# Patient Record
Sex: FEMALE | Race: BLACK OR AFRICAN AMERICAN | NOT HISPANIC OR LATINO
[De-identification: names, ages, dates, MRNs, and addresses within clinical notes are randomized per-mention and may not be internally consistent; named-entity substitution may affect disease eponyms.]

---

## 2017-01-19 ENCOUNTER — RX RENEWAL (OUTPATIENT)
Age: 42
End: 2017-01-19

## 2017-01-27 ENCOUNTER — RX RENEWAL (OUTPATIENT)
Age: 42
End: 2017-01-27

## 2017-04-13 ENCOUNTER — RX RENEWAL (OUTPATIENT)
Age: 42
End: 2017-04-13

## 2017-05-16 ENCOUNTER — LABORATORY RESULT (OUTPATIENT)
Age: 42
End: 2017-05-16

## 2017-05-16 ENCOUNTER — OUTPATIENT (OUTPATIENT)
Dept: OUTPATIENT SERVICES | Facility: HOSPITAL | Age: 42
LOS: 1 days | End: 2017-05-16
Payer: MEDICAID

## 2017-05-16 ENCOUNTER — APPOINTMENT (OUTPATIENT)
Dept: INFECTIOUS DISEASE | Facility: CLINIC | Age: 42
End: 2017-05-16

## 2017-05-16 VITALS
SYSTOLIC BLOOD PRESSURE: 126 MMHG | BODY MASS INDEX: 29.88 KG/M2 | OXYGEN SATURATION: 98 % | DIASTOLIC BLOOD PRESSURE: 91 MMHG | HEIGHT: 64 IN | HEART RATE: 71 BPM | TEMPERATURE: 97.5 F | WEIGHT: 175 LBS

## 2017-05-16 DIAGNOSIS — B20 HUMAN IMMUNODEFICIENCY VIRUS [HIV] DISEASE: ICD-10-CM

## 2017-05-16 LAB
ALBUMIN SERPL ELPH-MCNC: 4.2 G/DL — SIGNIFICANT CHANGE UP (ref 3.3–5)
ALP SERPL-CCNC: 54 U/L — SIGNIFICANT CHANGE UP (ref 40–120)
ALT FLD-CCNC: 19 U/L — SIGNIFICANT CHANGE UP (ref 10–45)
ANION GAP SERPL CALC-SCNC: 15 MMOL/L — SIGNIFICANT CHANGE UP (ref 5–17)
AST SERPL-CCNC: 23 U/L — SIGNIFICANT CHANGE UP (ref 10–40)
BILIRUB SERPL-MCNC: 0.6 MG/DL — SIGNIFICANT CHANGE UP (ref 0.2–1.2)
BUN SERPL-MCNC: 9 MG/DL — SIGNIFICANT CHANGE UP (ref 7–23)
CALCIUM SERPL-MCNC: 9.4 MG/DL — SIGNIFICANT CHANGE UP (ref 8.4–10.5)
CHLORIDE SERPL-SCNC: 104 MMOL/L — SIGNIFICANT CHANGE UP (ref 96–108)
CO2 SERPL-SCNC: 21 MMOL/L — LOW (ref 22–31)
CREAT SERPL-MCNC: 1.04 MG/DL — SIGNIFICANT CHANGE UP (ref 0.5–1.3)
GLUCOSE SERPL-MCNC: 79 MG/DL — SIGNIFICANT CHANGE UP (ref 70–99)
HBA1C BLD-MCNC: 5.5 % — SIGNIFICANT CHANGE UP (ref 4–5.6)
HCT VFR BLD CALC: 37.7 % — SIGNIFICANT CHANGE UP (ref 34.5–45)
HGB BLD-MCNC: 12 G/DL — SIGNIFICANT CHANGE UP (ref 11.5–15.5)
MCHC RBC-ENTMCNC: 31.8 GM/DL — LOW (ref 32–36)
MCHC RBC-ENTMCNC: 32.1 PG — SIGNIFICANT CHANGE UP (ref 27–34)
MCV RBC AUTO: 100.8 FL — HIGH (ref 80–100)
PLATELET # BLD AUTO: 335 K/UL — SIGNIFICANT CHANGE UP (ref 150–400)
POTASSIUM SERPL-MCNC: 4 MMOL/L — SIGNIFICANT CHANGE UP (ref 3.5–5.3)
POTASSIUM SERPL-SCNC: 4 MMOL/L — SIGNIFICANT CHANGE UP (ref 3.5–5.3)
PROT SERPL-MCNC: 7.7 G/DL — SIGNIFICANT CHANGE UP (ref 6–8.3)
RBC # BLD: 3.74 M/UL — LOW (ref 3.8–5.2)
RBC # FLD: 13.5 % — SIGNIFICANT CHANGE UP (ref 10.3–14.5)
SODIUM SERPL-SCNC: 140 MMOL/L — SIGNIFICANT CHANGE UP (ref 135–145)
WBC # BLD: 7.62 K/UL — SIGNIFICANT CHANGE UP (ref 3.8–10.5)
WBC # FLD AUTO: 7.62 K/UL — SIGNIFICANT CHANGE UP (ref 3.8–10.5)

## 2017-05-16 PROCEDURE — 85027 COMPLETE CBC AUTOMATED: CPT

## 2017-05-16 PROCEDURE — 83036 HEMOGLOBIN GLYCOSYLATED A1C: CPT

## 2017-05-16 PROCEDURE — 87536 HIV-1 QUANT&REVRSE TRNSCRPJ: CPT

## 2017-05-16 PROCEDURE — 80053 COMPREHEN METABOLIC PANEL: CPT

## 2017-05-16 PROCEDURE — 86780 TREPONEMA PALLIDUM: CPT

## 2017-05-16 PROCEDURE — 90471 IMMUNIZATION ADMIN: CPT

## 2017-05-16 PROCEDURE — 86360 T CELL ABSOLUTE COUNT/RATIO: CPT

## 2017-05-16 PROCEDURE — 86592 SYPHILIS TEST NON-TREP QUAL: CPT

## 2017-05-16 PROCEDURE — G0463: CPT | Mod: 25

## 2017-05-16 PROCEDURE — 84156 ASSAY OF PROTEIN URINE: CPT

## 2017-05-16 PROCEDURE — 90734 MENACWYD/MENACWYCRM VACC IM: CPT

## 2017-05-16 PROCEDURE — 90834 PSYTX W PT 45 MINUTES: CPT

## 2017-05-17 LAB
4/8 RATIO: 0.56 RATIO — LOW (ref 0.9–3.6)
ABS CD8: 978 /UL — HIGH (ref 136–757)
C TRACH RRNA SPEC QL NAA+PROBE: SIGNIFICANT CHANGE UP
CD3 BLASTS SPEC-ACNC: 1519 /UL — SIGNIFICANT CHANGE UP (ref 799–2171)
CD3 BLASTS SPEC-ACNC: 74 % — SIGNIFICANT CHANGE UP (ref 59–85)
CD4 %: 27 % — LOW (ref 36–65)
CD8 %: 48 % — HIGH (ref 11–36)
CREAT ?TM UR-MCNC: 300 MG/DL — SIGNIFICANT CHANGE UP
HIV1 RNA # SERPL NAA+PROBE: SIGNIFICANT CHANGE UP
HIV1 RNA SERPL NAA+PROBE-LOG#: ABNORMAL LG10COP/ML
N GONORRHOEA RRNA SPEC QL NAA+PROBE: SIGNIFICANT CHANGE UP
PROT ?TM UR-MCNC: 18 MG/DL — HIGH (ref 0–12)
PROT/CREAT UR-RTO: 0.1 — SIGNIFICANT CHANGE UP
SPECIMEN SOURCE: SIGNIFICANT CHANGE UP
T-CELL CD4 SUBSET PNL BLD: 544 /UL — SIGNIFICANT CHANGE UP (ref 489–1457)

## 2017-05-18 LAB
RPR SERPL-ACNC: SIGNIFICANT CHANGE UP
T PALLIDUM AB TITR SER: POSITIVE
T PALLIDUM IGG SER QL IF: REACTIVE

## 2017-05-31 DIAGNOSIS — Z23 ENCOUNTER FOR IMMUNIZATION: ICD-10-CM

## 2017-06-22 ENCOUNTER — RX RENEWAL (OUTPATIENT)
Age: 42
End: 2017-06-22

## 2017-09-14 ENCOUNTER — RX RENEWAL (OUTPATIENT)
Age: 42
End: 2017-09-14

## 2017-10-17 ENCOUNTER — APPOINTMENT (OUTPATIENT)
Dept: INFECTIOUS DISEASE | Facility: CLINIC | Age: 42
End: 2017-10-17

## 2017-10-18 ENCOUNTER — RX RENEWAL (OUTPATIENT)
Age: 42
End: 2017-10-18

## 2017-11-15 ENCOUNTER — RX RENEWAL (OUTPATIENT)
Age: 42
End: 2017-11-15

## 2017-11-21 ENCOUNTER — APPOINTMENT (OUTPATIENT)
Dept: INFECTIOUS DISEASE | Facility: CLINIC | Age: 42
End: 2017-11-21

## 2018-01-25 ENCOUNTER — RX RENEWAL (OUTPATIENT)
Age: 43
End: 2018-01-25

## 2018-03-02 ENCOUNTER — RX RENEWAL (OUTPATIENT)
Age: 43
End: 2018-03-02

## 2018-03-06 ENCOUNTER — APPOINTMENT (OUTPATIENT)
Dept: INFECTIOUS DISEASE | Facility: CLINIC | Age: 43
End: 2018-03-06

## 2018-04-10 ENCOUNTER — RX RENEWAL (OUTPATIENT)
Age: 43
End: 2018-04-10

## 2018-06-11 ENCOUNTER — RX RENEWAL (OUTPATIENT)
Age: 43
End: 2018-06-11

## 2018-08-01 ENCOUNTER — OUTPATIENT (OUTPATIENT)
Dept: OUTPATIENT SERVICES | Facility: HOSPITAL | Age: 43
LOS: 1 days | End: 2018-08-01
Payer: MEDICAID

## 2018-08-01 PROCEDURE — G9001: CPT

## 2018-08-28 DIAGNOSIS — Z71.89 OTHER SPECIFIED COUNSELING: ICD-10-CM

## 2018-09-10 ENCOUNTER — MEDICATION RENEWAL (OUTPATIENT)
Age: 43
End: 2018-09-10

## 2018-10-10 ENCOUNTER — APPOINTMENT (OUTPATIENT)
Dept: INFECTIOUS DISEASE | Facility: CLINIC | Age: 43
End: 2018-10-10

## 2018-10-15 ENCOUNTER — RX RENEWAL (OUTPATIENT)
Age: 43
End: 2018-10-15

## 2018-10-16 ENCOUNTER — RX RENEWAL (OUTPATIENT)
Age: 43
End: 2018-10-16

## 2018-11-16 ENCOUNTER — RX RENEWAL (OUTPATIENT)
Age: 43
End: 2018-11-16

## 2018-11-27 ENCOUNTER — RX RENEWAL (OUTPATIENT)
Age: 43
End: 2018-11-27

## 2018-11-28 ENCOUNTER — APPOINTMENT (OUTPATIENT)
Dept: INFECTIOUS DISEASE | Facility: CLINIC | Age: 43
End: 2018-11-28

## 2018-11-28 ENCOUNTER — CHART COPY (OUTPATIENT)
Age: 43
End: 2018-11-28

## 2018-11-28 ENCOUNTER — OUTPATIENT (OUTPATIENT)
Dept: OUTPATIENT SERVICES | Facility: HOSPITAL | Age: 43
LOS: 1 days | End: 2018-11-28
Payer: MEDICAID

## 2018-11-28 ENCOUNTER — APPOINTMENT (OUTPATIENT)
Dept: INFECTIOUS DISEASE | Facility: CLINIC | Age: 43
End: 2018-11-28
Payer: MEDICAID

## 2018-11-28 VITALS
WEIGHT: 171 LBS | SYSTOLIC BLOOD PRESSURE: 163 MMHG | RESPIRATION RATE: 16 BRPM | TEMPERATURE: 97.1 F | BODY MASS INDEX: 29.35 KG/M2 | OXYGEN SATURATION: 98 % | HEART RATE: 75 BPM | DIASTOLIC BLOOD PRESSURE: 108 MMHG

## 2018-11-28 PROCEDURE — 99214 OFFICE O/P EST MOD 30 MIN: CPT

## 2018-11-28 PROCEDURE — 90686 IIV4 VACC NO PRSV 0.5 ML IM: CPT

## 2018-11-28 PROCEDURE — G0008: CPT

## 2018-11-28 PROCEDURE — G0463: CPT | Mod: 25

## 2018-11-30 DIAGNOSIS — B20 HUMAN IMMUNODEFICIENCY VIRUS [HIV] DISEASE: ICD-10-CM

## 2018-12-05 DIAGNOSIS — Z23 ENCOUNTER FOR IMMUNIZATION: ICD-10-CM

## 2019-02-07 ENCOUNTER — RX RENEWAL (OUTPATIENT)
Age: 44
End: 2019-02-07

## 2019-03-05 ENCOUNTER — RX RENEWAL (OUTPATIENT)
Age: 44
End: 2019-03-05

## 2019-03-06 ENCOUNTER — RX RENEWAL (OUTPATIENT)
Age: 44
End: 2019-03-06

## 2019-03-27 ENCOUNTER — APPOINTMENT (OUTPATIENT)
Dept: INFECTIOUS DISEASE | Facility: CLINIC | Age: 44
End: 2019-03-27

## 2019-04-03 ENCOUNTER — RX RENEWAL (OUTPATIENT)
Age: 44
End: 2019-04-03

## 2019-05-01 ENCOUNTER — RX RENEWAL (OUTPATIENT)
Age: 44
End: 2019-05-01

## 2019-08-20 ENCOUNTER — RX RENEWAL (OUTPATIENT)
Age: 44
End: 2019-08-20

## 2019-08-20 PROBLEM — Z00.00 ENCOUNTER FOR PREVENTIVE HEALTH EXAMINATION: Noted: 2019-08-20

## 2019-08-27 ENCOUNTER — FORM ENCOUNTER (OUTPATIENT)
Age: 44
End: 2019-08-27

## 2019-08-28 ENCOUNTER — APPOINTMENT (OUTPATIENT)
Dept: INFECTIOUS DISEASE | Facility: CLINIC | Age: 44
End: 2019-08-28

## 2019-08-28 ENCOUNTER — OUTPATIENT (OUTPATIENT)
Dept: OUTPATIENT SERVICES | Facility: HOSPITAL | Age: 44
LOS: 1 days | End: 2019-08-28
Payer: MEDICAID

## 2019-08-28 ENCOUNTER — APPOINTMENT (OUTPATIENT)
Dept: INFECTIOUS DISEASE | Facility: CLINIC | Age: 44
End: 2019-08-28
Payer: MEDICAID

## 2019-08-28 VITALS
DIASTOLIC BLOOD PRESSURE: 100 MMHG | WEIGHT: 169 LBS | OXYGEN SATURATION: 100 % | HEART RATE: 62 BPM | TEMPERATURE: 97.4 F | SYSTOLIC BLOOD PRESSURE: 155 MMHG | BODY MASS INDEX: 28.85 KG/M2 | HEIGHT: 64 IN

## 2019-08-28 PROCEDURE — 99214 OFFICE O/P EST MOD 30 MIN: CPT

## 2019-08-28 PROCEDURE — G0463: CPT | Mod: 25

## 2019-08-28 PROCEDURE — 90834 PSYTX W PT 45 MINUTES: CPT

## 2019-09-02 NOTE — PHYSICAL EXAM
[General Appearance - Alert] : alert [Sclera] : the sclera and conjunctiva were normal [PERRL With Normal Accommodation] : pupils were equal in size, round, reactive to light [Extraocular Movements] : extraocular movements were intact [Outer Ear] : the ears and nose were normal in appearance [Oropharynx] : the oropharynx was normal with no thrush [Neck Appearance] : the appearance of the neck was normal [Neck Cervical Mass (___cm)] : no neck mass was observed [Jugular Venous Distention Increased] : there was no jugular-venous distention [Thyroid Diffuse Enlargement] : the thyroid was not enlarged [Auscultation Breath Sounds / Voice Sounds] : lungs were clear to auscultation bilaterally [Heart Rate And Rhythm] : heart rate was normal and rhythm regular [Heart Sounds] : normal S1 and S2 [Heart Sounds Gallop] : no gallops [Murmurs] : no murmurs [Heart Sounds Pericardial Friction Rub] : no pericardial rub [Full Pulse] : the pedal pulses are present [Edema] : there was no peripheral edema [Bowel Sounds] : normal bowel sounds [Abdomen Soft] : soft [Abdomen Tenderness] : non-tender [Abdomen Mass (___ Cm)] : no abdominal mass palpated [Costovertebral Angle Tenderness] : no CVA tenderness [Urinary Bladder Findings] : the bladder was normal on palpation [No Palpable Adenopathy] : no palpable adenopathy [Musculoskeletal - Swelling] : no joint swelling [Nail Clubbing] : no clubbing  or cyanosis of the fingernails [Motor Tone] : muscle strength and tone were normal [Skin Color & Pigmentation] : normal skin color and pigmentation [] : no rash [Oriented To Time, Place, And Person] : oriented to person, place, and time [Affect] : the affect was normal [FreeTextEntry1] : obese

## 2019-09-02 NOTE — ASSESSMENT
[Treatment Education] : treatment education [Treatment Adherence] : treatment adherence [Rx Dose / Side Effects] : Rx dose/side effects [Risk Reduction] : risk reduction [Nutritional / Food Issues] : nutritional/food issues [Universal Precautions] : universal precautions [Medical Care Issues] : medical care issues [HIV Education] : HIV Education [Sexuality / Safer Sex] : sexuality/safer sex [Partner Notification Info/Discussion] : partner notification info/discussion [FreeTextEntry1] : 40 yo F w/ hx of latent syphills s/p tx, HTN, HIV AIDS dx 1/13 currently well-controlled on complera here for followup.\par \par 1. HIV/AIDS - last CD4 544 improved from previous  392, VL <40 - continue with complera does not want to change her regimen; check labs today including viral load, T cell subset, CBC and BMP\par \par 2. HTN - pt has tried several different Bp medications, currently listed on Triamterene-HCTZ, pt is unaware of the name of her medication\par \par 3. HCM - completed hep B vaccinations back in 7/14 but hep B s AB indeterminate - booster given 12/14 -repeat Hep B Ab checked 4/15 (-), series repeated and completed 2016. UTD on pna, tdap, hep A vaccines, pap 8/14 nl.  \par Infleunza today\par \par Pt would like 6 month follow up. She would prefer to see Dr Arboleda if possible\par \par \par \par

## 2019-09-02 NOTE — END OF VISIT
[] : Fellow [FreeTextEntry3] : Despite her missed visits, uncooperative nature and drug-seeking, Ms Mcgee has well controlled HIV on Complera.  However, her HTN is not well controlled. She may be non-adherent to diuretic or need a second med. We told her we would not prescribe opioids for her ill-defined chronic pain.

## 2019-09-02 NOTE — HISTORY OF PRESENT ILLNESS
[Condom Use] : using condoms [FreeTextEntry1] : 40 yo F w/ hx of HIV/AIDS well-controlled on complera, HTN, hx of latent syphillis tx'ed here for follow up visit. \par \par Previous history of poor follow up in clinic but last seen in Nov 2018. Patient with intermittent refusal to participate in exam after showing up 1 hour late,. Says she wants her previous doctor who is a male, though last seen by Dr Moreno and does not know the name of other doctor. Denies fever, chills, worsening of chronic cough, throat pain, dysuria, discharge. Denies current drug use though appears to be altered during interview. Denies sexual intercourse\par \par Patient complains of vague pain giving her problem sleeping. After asking several times, she says she has back pain for which she sees chiropractor and would like oxycontin. Told patient about non-opiod options such as lidocaine patch which she was not interested in\par  [Sexually Active] : The patient is not sexually active [de-identified] : none, planing to go to [place] in near future [de-identified] : not working

## 2019-10-03 DIAGNOSIS — B20 HUMAN IMMUNODEFICIENCY VIRUS [HIV] DISEASE: ICD-10-CM

## 2019-10-07 ENCOUNTER — RX RENEWAL (OUTPATIENT)
Age: 44
End: 2019-10-07

## 2019-12-03 ENCOUNTER — RX RENEWAL (OUTPATIENT)
Age: 44
End: 2019-12-03

## 2019-12-13 ENCOUNTER — RX RENEWAL (OUTPATIENT)
Age: 44
End: 2019-12-13

## 2020-02-05 ENCOUNTER — RX RENEWAL (OUTPATIENT)
Age: 45
End: 2020-02-05

## 2020-03-22 ENCOUNTER — RX RENEWAL (OUTPATIENT)
Age: 45
End: 2020-03-22

## 2020-04-17 ENCOUNTER — RX RENEWAL (OUTPATIENT)
Age: 45
End: 2020-04-17

## 2020-04-21 ENCOUNTER — RX RENEWAL (OUTPATIENT)
Age: 45
End: 2020-04-21

## 2020-06-17 ENCOUNTER — RX RENEWAL (OUTPATIENT)
Age: 45
End: 2020-06-17

## 2020-08-19 ENCOUNTER — RX RENEWAL (OUTPATIENT)
Age: 45
End: 2020-08-19

## 2020-10-06 ENCOUNTER — FORM ENCOUNTER (OUTPATIENT)
Age: 45
End: 2020-10-06

## 2020-10-06 ENCOUNTER — RX RENEWAL (OUTPATIENT)
Age: 45
End: 2020-10-06

## 2020-10-07 ENCOUNTER — APPOINTMENT (OUTPATIENT)
Dept: INFECTIOUS DISEASE | Facility: CLINIC | Age: 45
End: 2020-10-07
Payer: MEDICAID

## 2020-10-07 ENCOUNTER — LABORATORY RESULT (OUTPATIENT)
Age: 45
End: 2020-10-07

## 2020-10-07 ENCOUNTER — OUTPATIENT (OUTPATIENT)
Dept: OUTPATIENT SERVICES | Facility: HOSPITAL | Age: 45
LOS: 1 days | End: 2020-10-07
Payer: MEDICAID

## 2020-10-07 VITALS
SYSTOLIC BLOOD PRESSURE: 176 MMHG | OXYGEN SATURATION: 99 % | HEART RATE: 62 BPM | BODY MASS INDEX: 26.46 KG/M2 | WEIGHT: 155 LBS | HEIGHT: 64 IN | DIASTOLIC BLOOD PRESSURE: 117 MMHG | TEMPERATURE: 97.9 F

## 2020-10-07 DIAGNOSIS — Z23 ENCOUNTER FOR IMMUNIZATION: ICD-10-CM

## 2020-10-07 DIAGNOSIS — B20 HUMAN IMMUNODEFICIENCY VIRUS [HIV] DISEASE: ICD-10-CM

## 2020-10-07 PROCEDURE — 99215 OFFICE O/P EST HI 40 MIN: CPT | Mod: GC

## 2020-10-07 PROCEDURE — G0008: CPT

## 2020-10-07 PROCEDURE — 90688 IIV4 VACCINE SPLT 0.5 ML IM: CPT

## 2020-10-07 PROCEDURE — 90834 PSYTX W PT 45 MINUTES: CPT

## 2020-10-07 PROCEDURE — G0463: CPT | Mod: 25

## 2020-10-08 NOTE — END OF VISIT
[] : Fellow [FreeTextEntry3] : Patient seen and examined with Dr. García\par \par I agree with his history exam and plans as noted above\par \par Patient was last seen in clinic over a year ago but reports adherence with ARVs\par \par Needs updated annual labs to assess stability and gyn referral, declines psych referral for support\par \par RTC 3-4mo

## 2020-10-08 NOTE — PHYSICAL EXAM
[General Appearance - Alert] : alert [Sclera] : the sclera and conjunctiva were normal [PERRL With Normal Accommodation] : pupils were equal in size, round, reactive to light [Extraocular Movements] : extraocular movements were intact [Outer Ear] : the ears and nose were normal in appearance [Oropharynx] : the oropharynx was normal with no thrush [Neck Appearance] : the appearance of the neck was normal [Neck Cervical Mass (___cm)] : no neck mass was observed [Jugular Venous Distention Increased] : there was no jugular-venous distention [Thyroid Diffuse Enlargement] : the thyroid was not enlarged [Auscultation Breath Sounds / Voice Sounds] : lungs were clear to auscultation bilaterally [Heart Rate And Rhythm] : heart rate was normal and rhythm regular [Heart Sounds] : normal S1 and S2 [Heart Sounds Gallop] : no gallops [Murmurs] : no murmurs [Heart Sounds Pericardial Friction Rub] : no pericardial rub [Full Pulse] : the pedal pulses are present [Edema] : there was no peripheral edema [Bowel Sounds] : normal bowel sounds [Abdomen Soft] : soft [Abdomen Tenderness] : non-tender [] : no hepato-splenomegaly [Abdomen Mass (___ Cm)] : no abdominal mass palpated [Costovertebral Angle Tenderness] : no CVA tenderness [Urinary Bladder Findings] : the bladder was normal on palpation [No Palpable Adenopathy] : no palpable adenopathy [Musculoskeletal - Swelling] : no joint swelling [Nail Clubbing] : no clubbing  or cyanosis of the fingernails [Motor Tone] : muscle strength and tone were normal [Oriented To Time, Place, And Person] : oriented to person, place, and time [Affect] : the affect was normal [FreeTextEntry1] : Few scattered vesicular appearing lesions around sacrum, swabbed for PCR

## 2020-10-08 NOTE — ASSESSMENT
[Treatment Education] : treatment education [Treatment Adherence] : treatment adherence [Rx Dose / Side Effects] : Rx dose/side effects [Risk Reduction] : risk reduction [Nutritional / Food Issues] : nutritional/food issues [Universal Precautions] : universal precautions [Medical Care Issues] : medical care issues [HIV Education] : HIV Education [Sexuality / Safer Sex] : sexuality/safer sex [Partner Notification Info/Discussion] : partner notification info/discussion [FreeTextEntry1] : 44 yo F w/ hx of latent syphills s/p tx, HTN, HIV AIDS currently well-controlled on complera here for followup.\par \par  HIV/AIDS \par -well controlled on previous labs, no labs since 8/19, will recheck today\par -Pt takes complera, not interested in updating regimen in spite of counseling\par -check annual labs\par \par Vesicular Rash\par -HSV more likely than VZV. Describes occasional cold sores and states she gets this rash on her sacrum every 6 months or so.\par -swabbed for PCR, follow up results\par \par  HTN - uncontrolled\par -Pt is states that her PCP managed her BP but states that our clinic refills her medications. Unclear how often she is following with PCP\par -BP is uncontrolled on this visit and patient is only listed on clonidine, which is not idea BP medication\par -Only allergy listed was amlodipine, on chart review pt was previously on Lisinopril 2016 but reportedly has rash as reaction, allergies updated\par -most recently appeared to be on Triamterene-HCTZ, appears to have been discontinued by pharmacy in spring 2020, likely due to patients lack of follow up\par -Ideally believe pt should follow with PCP for HTN management, however she is unclear on who is managing her BP and has history of poor follow up. Due to elevated BP at this time will initiate therapy\par -will restart HCTZ 12.5 mg daily, advised patient to return in one month for BP check and repeat labs\par -home BP machine ordered\par \par HCM\par  - Up to date on previous vaccinations\par - encouraged patient to take influenza vaccine today,unclear if she will agree based on concern over her previous "reaction"\par -has PCP, patient is unclear on follow up history but states she followed with him in last few months\par -states she is up to date on mammography with PCP, cannot state when she last had it\par -no fam hx of colon ca\par \par Recommended to follow up in one month for BP recheck and to monitor electrolytes and renal function\par \par \par \par

## 2020-10-08 NOTE — HISTORY OF PRESENT ILLNESS
[Condom Use] : using condoms [FreeTextEntry1] : 44 yo F w/ hx of HIV/AIDS well-controlled on complera, HTN, hx of latent syphillis tx'ed here for follow up visit. \par \par Pt with history of poor follow up last seen in August 2019 by myself. Claims compliance with Complera, which she does not want to update to newer ART. No adverse effects, no fever, chills, sore throat, diarrhea, dysuria. Admits to intermittent flares of sacral rash, says it "resolved with cream". Adamantly denies history of herpes, but states that she sometimes gets "cold sores".\par \par Pt has PCP but unclear how often she follows with him. States she last saw within last 3-4 months and is up to date on mammograms.\par  [Sexually Active] : The patient is not sexually active [de-identified] : none, planing to go to [place] in near future [de-identified] : not working

## 2020-10-09 ENCOUNTER — RX RENEWAL (OUTPATIENT)
Age: 45
End: 2020-10-09

## 2020-10-12 DIAGNOSIS — Z23 ENCOUNTER FOR IMMUNIZATION: ICD-10-CM

## 2020-11-24 ENCOUNTER — RX RENEWAL (OUTPATIENT)
Age: 45
End: 2020-11-24

## 2020-11-24 RX ORDER — VALACYCLOVIR 500 MG/1
500 TABLET, FILM COATED ORAL
Qty: 20 | Refills: 0 | Status: ACTIVE | COMMUNITY
Start: 2020-10-10

## 2021-01-04 ENCOUNTER — NON-APPOINTMENT (OUTPATIENT)
Age: 46
End: 2021-01-04

## 2021-01-22 ENCOUNTER — RX RENEWAL (OUTPATIENT)
Age: 46
End: 2021-01-22

## 2021-02-07 ENCOUNTER — RX RENEWAL (OUTPATIENT)
Age: 46
End: 2021-02-07

## 2021-02-16 ENCOUNTER — FORM ENCOUNTER (OUTPATIENT)
Age: 46
End: 2021-02-16

## 2021-02-17 ENCOUNTER — OUTPATIENT (OUTPATIENT)
Dept: OUTPATIENT SERVICES | Facility: HOSPITAL | Age: 46
LOS: 1 days | End: 2021-02-17
Payer: MEDICAID

## 2021-02-17 ENCOUNTER — APPOINTMENT (OUTPATIENT)
Dept: INFECTIOUS DISEASE | Facility: CLINIC | Age: 46
End: 2021-02-17
Payer: MEDICAID

## 2021-02-17 ENCOUNTER — APPOINTMENT (OUTPATIENT)
Dept: INFECTIOUS DISEASE | Facility: CLINIC | Age: 46
End: 2021-02-17

## 2021-02-17 ENCOUNTER — LABORATORY RESULT (OUTPATIENT)
Age: 46
End: 2021-02-17

## 2021-02-17 VITALS
OXYGEN SATURATION: 99 % | TEMPERATURE: 97.6 F | DIASTOLIC BLOOD PRESSURE: 118 MMHG | WEIGHT: 148 LBS | BODY MASS INDEX: 25.27 KG/M2 | HEIGHT: 64 IN | SYSTOLIC BLOOD PRESSURE: 166 MMHG | HEART RATE: 64 BPM

## 2021-02-17 VITALS — SYSTOLIC BLOOD PRESSURE: 167 MMHG | DIASTOLIC BLOOD PRESSURE: 114 MMHG | HEART RATE: 55 BPM

## 2021-02-17 DIAGNOSIS — B20 HUMAN IMMUNODEFICIENCY VIRUS [HIV] DISEASE: ICD-10-CM

## 2021-02-17 DIAGNOSIS — N89.8 OTHER SPECIFIED NONINFLAMMATORY DISORDERS OF VAGINA: ICD-10-CM

## 2021-02-17 PROCEDURE — G0463: CPT

## 2021-02-17 PROCEDURE — 99214 OFFICE O/P EST MOD 30 MIN: CPT | Mod: GC

## 2021-02-17 PROCEDURE — 87491 CHLMYD TRACH DNA AMP PROBE: CPT

## 2021-02-17 PROCEDURE — 90834 PSYTX W PT 45 MINUTES: CPT

## 2021-02-17 PROCEDURE — 87591 N.GONORRHOEAE DNA AMP PROB: CPT

## 2021-02-17 RX ORDER — CLOTRIMAZOLE 10 MG/G
1 CREAM TOPICAL 3 TIMES DAILY
Qty: 1 | Refills: 0 | Status: ACTIVE | COMMUNITY
Start: 2021-02-17 | End: 1900-01-01

## 2021-02-17 RX ORDER — PERMETHRIN 1 %-0.25 %
1 & 0.25 COMBINATION PACKAGE (ML) MISCELLANEOUS
Qty: 1 | Refills: 0 | Status: ACTIVE | COMMUNITY
Start: 2021-02-17 | End: 1900-01-01

## 2021-02-18 LAB
C TRACH RRNA SPEC QL NAA+PROBE: SIGNIFICANT CHANGE UP
N GONORRHOEA RRNA SPEC QL NAA+PROBE: SIGNIFICANT CHANGE UP
SPECIMEN SOURCE: SIGNIFICANT CHANGE UP

## 2021-02-20 NOTE — PHYSICAL EXAM
[General Appearance - Alert] : alert [Sclera] : the sclera and conjunctiva were normal [PERRL With Normal Accommodation] : pupils were equal in size, round, reactive to light [Extraocular Movements] : extraocular movements were intact [Outer Ear] : the ears and nose were normal in appearance [Oropharynx] : the oropharynx was normal with no thrush [Neck Appearance] : the appearance of the neck was normal [Neck Cervical Mass (___cm)] : no neck mass was observed [Jugular Venous Distention Increased] : there was no jugular-venous distention [Thyroid Diffuse Enlargement] : the thyroid was not enlarged [Auscultation Breath Sounds / Voice Sounds] : lungs were clear to auscultation bilaterally [Heart Rate And Rhythm] : heart rate was normal and rhythm regular [Heart Sounds] : normal S1 and S2 [Heart Sounds Gallop] : no gallops [Murmurs] : no murmurs [Heart Sounds Pericardial Friction Rub] : no pericardial rub [Full Pulse] : the pedal pulses are present [Edema] : there was no peripheral edema [Bowel Sounds] : normal bowel sounds [Abdomen Soft] : soft [Abdomen Tenderness] : non-tender [Abdomen Mass (___ Cm)] : no abdominal mass palpated [Costovertebral Angle Tenderness] : no CVA tenderness [Urinary Bladder Findings] : the bladder was normal on palpation [No Palpable Adenopathy] : no palpable adenopathy [Musculoskeletal - Swelling] : no joint swelling [Nail Clubbing] : no clubbing  or cyanosis of the fingernails [Motor Tone] : muscle strength and tone were normal [Oriented To Time, Place, And Person] : oriented to person, place, and time [Affect] : the affect was normal [Skin Color & Pigmentation] : normal skin color and pigmentation [] : no rash [Deep Tendon Reflexes (DTR)] : deep tendon reflexes were 2+ and symmetric [Sensation] : the sensory exam was normal to light touch and pinprick [No Focal Deficits] : no focal deficits [FreeTextEntry1] : Defers  exam

## 2021-02-20 NOTE — END OF VISIT
[] : Fellow [FreeTextEntry3] : Patient seen and examined with Dr García\dinorah \par I agree with his interval history and exam and plans as outlined above

## 2021-02-20 NOTE — ASSESSMENT
[Treatment Education] : treatment education [Treatment Adherence] : treatment adherence [Rx Dose / Side Effects] : Rx dose/side effects [Risk Reduction] : risk reduction [Nutritional / Food Issues] : nutritional/food issues [Universal Precautions] : universal precautions [Medical Care Issues] : medical care issues [HIV Education] : HIV Education [Sexuality / Safer Sex] : sexuality/safer sex [Partner Notification Info/Discussion] : partner notification info/discussion [FreeTextEntry1] : 44 yo F w/ hx of latent syphills s/p tx, HTN, HIV AIDS currently well-controlled on complera here for followup.\par \par  HIV/AIDS \par -well controlled will recheck today\par -Pt takes complera, not interested in updating regimen in spite of counseling\par -check T cell subset, VL\par \par Vaginitis\par -Pt complains of vaginal pruritis, defers exam.\par -GYN referral\par -Vaginitis panel sent. Clotrimazole cream\par \par Vesicular Rash of Sacrum - resolved\par -HSV more likely than VZV. Describes occasional cold sores and states she gets this rash on her sacrum every 6 months or so.\par -swabbed for PCR, HSV-2\par -no recurrence since last visit\par \par  HTN - uncontrolled\par -Pt states she is compliant with HCTZ and clonidine daily, BP still uncontrolled\par -not following with PCP for HTN in spite of recommendations\par -no signs of HTN emergency\par -Only allergy listed was amlodipine, on chart review pt was previously on Lisinopril 2016 but reportedly has rash as reaction, allergies updated\par -CArdiology referral \par -most recently appeared to be on Triamterene-HCTZ, appears to have been discontinued by pharmacy in spring 2020, likely due to patients lack of follow up\par -Ideally believe pt should follow with PCP for HTN management, however she is unclear on who is managing her BP and has history of poor follow up. Due to elevated BP at this time will initiate therapy\par \par \par HCM\par  - Up to date on previous vaccinations\par - encouraged patient to take influenza vaccine today,unclear if she will agree based on concern over her previous "reaction"\par -states she is up to date on mammography with PCP, cannot state when she last had it\par -no fam hx of colon ca\par \par \par \par \par \par

## 2021-02-20 NOTE — HISTORY OF PRESENT ILLNESS
[Condom Use] : using condoms [FreeTextEntry1] : 46 yo F w/ hx of HIV/AIDS well-controlled on complera, uncontrolled HTN, hx of latent syphillis tx'ed. HSV2 infection of sacrum, here for follow up visit. \par \par Pt is feeling well, daily compliance with complera with no missed doses. Denies fever, chills, cough, sore throat, dysphagia, abdominal pain, diarrhea, dysuria.\par \par Complains of vaginal pruritis for two weeks since staying at her sisters place. Is worried about bed bugs or crabs. Denies itching anywhere else on body, denies discharge, dysuria, sexual activity in years.\par \par Patients HTN remains uncontrolled. /118, consistent with previous admissions. States daily compliance with her anti-HTN ( clonidine and HCTZ), takes BP at home every few days, says it is "less than 200". Denies HA, CP, blurry vision.\par \par Has vesicular lesions on sacrum last visit, postiive for HSV-2 on swab. Pt was prescribed valtrex but has not needed, denies recurrence, denies cold sores recently. [Sexually Active] : The patient is not sexually active [de-identified] : none, planing to go to [place] in near future [de-identified] : not working

## 2021-03-05 ENCOUNTER — NON-APPOINTMENT (OUTPATIENT)
Age: 46
End: 2021-03-05

## 2021-03-05 RX ORDER — HYDROCHLOROTHIAZIDE 12.5 MG/1
12.5 CAPSULE ORAL DAILY
Qty: 60 | Refills: 5 | Status: DISCONTINUED | COMMUNITY
Start: 2020-10-07 | End: 2021-03-05

## 2021-03-09 ENCOUNTER — NON-APPOINTMENT (OUTPATIENT)
Age: 46
End: 2021-03-09

## 2021-03-12 ENCOUNTER — NON-APPOINTMENT (OUTPATIENT)
Age: 46
End: 2021-03-12

## 2021-03-15 ENCOUNTER — NON-APPOINTMENT (OUTPATIENT)
Age: 46
End: 2021-03-15

## 2021-03-16 ENCOUNTER — RX RENEWAL (OUTPATIENT)
Age: 46
End: 2021-03-16

## 2021-03-19 ENCOUNTER — NON-APPOINTMENT (OUTPATIENT)
Age: 46
End: 2021-03-19

## 2021-03-29 ENCOUNTER — NON-APPOINTMENT (OUTPATIENT)
Age: 46
End: 2021-03-29

## 2021-04-05 ENCOUNTER — NON-APPOINTMENT (OUTPATIENT)
Age: 46
End: 2021-04-05

## 2021-04-05 RX ORDER — CLONIDINE HYDROCHLORIDE 0.1 MG/1
0.1 TABLET ORAL
Qty: 120 | Refills: 1 | Status: DISCONTINUED | COMMUNITY
Start: 2020-03-09 | End: 2021-04-05

## 2021-04-06 ENCOUNTER — NON-APPOINTMENT (OUTPATIENT)
Age: 46
End: 2021-04-06

## 2021-04-12 ENCOUNTER — NON-APPOINTMENT (OUTPATIENT)
Age: 46
End: 2021-04-12

## 2021-04-13 ENCOUNTER — NON-APPOINTMENT (OUTPATIENT)
Age: 46
End: 2021-04-13

## 2021-04-15 ENCOUNTER — APPOINTMENT (OUTPATIENT)
Dept: CARDIOLOGY | Facility: CLINIC | Age: 46
End: 2021-04-15
Payer: MEDICAID

## 2021-04-15 VITALS
OXYGEN SATURATION: 98 % | WEIGHT: 154 LBS | RESPIRATION RATE: 16 BRPM | TEMPERATURE: 97.2 F | SYSTOLIC BLOOD PRESSURE: 127 MMHG | BODY MASS INDEX: 26.43 KG/M2 | HEART RATE: 50 BPM | DIASTOLIC BLOOD PRESSURE: 82 MMHG

## 2021-04-15 DIAGNOSIS — R00.1 BRADYCARDIA, UNSPECIFIED: ICD-10-CM

## 2021-04-15 DIAGNOSIS — R00.0 TACHYCARDIA, UNSPECIFIED: ICD-10-CM

## 2021-04-15 PROCEDURE — 99204 OFFICE O/P NEW MOD 45 MIN: CPT

## 2021-04-15 PROCEDURE — 93000 ELECTROCARDIOGRAM COMPLETE: CPT

## 2021-04-15 PROCEDURE — 99072 ADDL SUPL MATRL&STAF TM PHE: CPT

## 2021-04-15 RX ORDER — HYDROCHLOROTHIAZIDE 25 MG/1
25 TABLET ORAL
Refills: 0 | Status: ACTIVE | COMMUNITY
Start: 2021-04-15

## 2021-04-15 NOTE — PHYSICAL EXAM
[General Appearance - Well Developed] : well developed [Normal Appearance] : normal appearance [Well Groomed] : well groomed [General Appearance - Well Nourished] : well nourished [No Deformities] : no deformities [General Appearance - In No Acute Distress] : no acute distress [Normal Conjunctiva] : the conjunctiva exhibited no abnormalities [Eyelids - No Xanthelasma] : the eyelids demonstrated no xanthelasmas [Normal Oral Mucosa] : normal oral mucosa [No Oral Pallor] : no oral pallor [No Oral Cyanosis] : no oral cyanosis [Normal Jugular Venous A Waves Present] : normal jugular venous A waves present [Normal Jugular Venous V Waves Present] : normal jugular venous V waves present [No Jugular Venous Regan A Waves] : no jugular venous regan A waves [Heart Rate And Rhythm] : heart rate and rhythm were normal [Heart Sounds] : normal S1 and S2 [Murmurs] : no murmurs present [Respiration, Rhythm And Depth] : normal respiratory rhythm and effort [Auscultation Breath Sounds / Voice Sounds] : lungs were clear to auscultation bilaterally [Exaggerated Use Of Accessory Muscles For Inspiration] : no accessory muscle use [Abdomen Soft] : soft [Abdomen Tenderness] : non-tender [Abdomen Mass (___ Cm)] : no abdominal mass palpated [Nail Clubbing] : no clubbing of the fingernails [Cyanosis, Localized] : no localized cyanosis [Petechial Hemorrhages (___cm)] : no petechial hemorrhages [Skin Color & Pigmentation] : normal skin color and pigmentation [] : no rash [No Venous Stasis] : no venous stasis [Skin Lesions] : no skin lesions [No Skin Ulcers] : no skin ulcer [No Xanthoma] : no  xanthoma was observed [Oriented To Time, Place, And Person] : oriented to person, place, and time [Affect] : the affect was normal [Mood] : the mood was normal [No Anxiety] : not feeling anxious

## 2021-04-15 NOTE — REASON FOR VISIT
[Initial Evaluation] : an initial evaluation of [Hypertension] : hypertension [Abnormal ECG] : an abnormal ECG [FreeTextEntry1] : 45 F HTN abnormal EKG with uncontrolled HTN and bradycardia.

## 2021-04-15 NOTE — HISTORY OF PRESENT ILLNESS
[FreeTextEntry1] : 1. HTN: on clonidine. Patient reports recent improved control.\par 2. HIV: on medications.\par 3. Abnormal EKG: patient was noted to be bradycardic and referred for further evaluation. She has noted intermittent chest pain with palpitations and dizziness lasting a few seconds. The patient denies SOB and her ET is stable. She denies cough or fevers.\par She has not received her COVID vaccine and states that she doesn't want to get it because "DMX  after getting the vaccine".

## 2021-04-19 ENCOUNTER — NON-APPOINTMENT (OUTPATIENT)
Age: 46
End: 2021-04-19

## 2021-04-27 ENCOUNTER — FORM ENCOUNTER (OUTPATIENT)
Age: 46
End: 2021-04-27

## 2021-04-27 ENCOUNTER — APPOINTMENT (OUTPATIENT)
Dept: CARDIOLOGY | Facility: CLINIC | Age: 46
End: 2021-04-27

## 2021-05-10 ENCOUNTER — NON-APPOINTMENT (OUTPATIENT)
Age: 46
End: 2021-05-10

## 2021-05-11 ENCOUNTER — NON-APPOINTMENT (OUTPATIENT)
Age: 46
End: 2021-05-11

## 2021-05-13 ENCOUNTER — RX RENEWAL (OUTPATIENT)
Age: 46
End: 2021-05-13

## 2021-05-14 ENCOUNTER — NON-APPOINTMENT (OUTPATIENT)
Age: 46
End: 2021-05-14

## 2021-05-18 ENCOUNTER — RX RENEWAL (OUTPATIENT)
Age: 46
End: 2021-05-18

## 2021-06-08 ENCOUNTER — RX RENEWAL (OUTPATIENT)
Age: 46
End: 2021-06-08

## 2021-06-25 ENCOUNTER — APPOINTMENT (OUTPATIENT)
Dept: INFECTIOUS DISEASE | Facility: CLINIC | Age: 46
End: 2021-06-25

## 2021-06-28 ENCOUNTER — NON-APPOINTMENT (OUTPATIENT)
Age: 46
End: 2021-06-28

## 2021-07-02 ENCOUNTER — NON-APPOINTMENT (OUTPATIENT)
Age: 46
End: 2021-07-02

## 2021-07-15 ENCOUNTER — RX RENEWAL (OUTPATIENT)
Age: 46
End: 2021-07-15

## 2021-08-16 ENCOUNTER — RX RENEWAL (OUTPATIENT)
Age: 46
End: 2021-08-16

## 2021-08-16 ENCOUNTER — NON-APPOINTMENT (OUTPATIENT)
Age: 46
End: 2021-08-16

## 2021-09-14 ENCOUNTER — RX RENEWAL (OUTPATIENT)
Age: 46
End: 2021-09-14

## 2021-09-29 ENCOUNTER — APPOINTMENT (OUTPATIENT)
Dept: INFECTIOUS DISEASE | Facility: CLINIC | Age: 46
End: 2021-09-29

## 2021-09-30 ENCOUNTER — NON-APPOINTMENT (OUTPATIENT)
Age: 46
End: 2021-09-30

## 2021-10-01 ENCOUNTER — NON-APPOINTMENT (OUTPATIENT)
Age: 46
End: 2021-10-01

## 2021-10-04 ENCOUNTER — NON-APPOINTMENT (OUTPATIENT)
Age: 46
End: 2021-10-04

## 2021-10-05 ENCOUNTER — NON-APPOINTMENT (OUTPATIENT)
Age: 46
End: 2021-10-05

## 2021-10-10 ENCOUNTER — RX RENEWAL (OUTPATIENT)
Age: 46
End: 2021-10-10

## 2021-10-12 ENCOUNTER — NON-APPOINTMENT (OUTPATIENT)
Age: 46
End: 2021-10-12

## 2021-10-19 ENCOUNTER — RX RENEWAL (OUTPATIENT)
Age: 46
End: 2021-10-19

## 2021-11-02 ENCOUNTER — NON-APPOINTMENT (OUTPATIENT)
Age: 46
End: 2021-11-02

## 2021-11-05 ENCOUNTER — NON-APPOINTMENT (OUTPATIENT)
Age: 46
End: 2021-11-05

## 2021-11-07 ENCOUNTER — RX RENEWAL (OUTPATIENT)
Age: 46
End: 2021-11-07

## 2021-11-16 ENCOUNTER — RX RENEWAL (OUTPATIENT)
Age: 46
End: 2021-11-16

## 2021-11-18 ENCOUNTER — RX RENEWAL (OUTPATIENT)
Age: 46
End: 2021-11-18

## 2021-11-22 ENCOUNTER — NON-APPOINTMENT (OUTPATIENT)
Age: 46
End: 2021-11-22

## 2021-12-08 ENCOUNTER — APPOINTMENT (OUTPATIENT)
Dept: INFECTIOUS DISEASE | Facility: CLINIC | Age: 46
End: 2021-12-08

## 2021-12-15 ENCOUNTER — APPOINTMENT (OUTPATIENT)
Dept: INFECTIOUS DISEASE | Facility: CLINIC | Age: 46
End: 2021-12-15

## 2021-12-17 ENCOUNTER — RX RENEWAL (OUTPATIENT)
Age: 46
End: 2021-12-17

## 2021-12-17 ENCOUNTER — NON-APPOINTMENT (OUTPATIENT)
Age: 46
End: 2021-12-17

## 2021-12-20 ENCOUNTER — NON-APPOINTMENT (OUTPATIENT)
Age: 46
End: 2021-12-20

## 2021-12-22 ENCOUNTER — RX RENEWAL (OUTPATIENT)
Age: 46
End: 2021-12-22

## 2022-01-05 ENCOUNTER — APPOINTMENT (OUTPATIENT)
Dept: INFECTIOUS DISEASE | Facility: CLINIC | Age: 47
End: 2022-01-05

## 2022-01-06 ENCOUNTER — NON-APPOINTMENT (OUTPATIENT)
Age: 47
End: 2022-01-06

## 2022-02-14 ENCOUNTER — RX RENEWAL (OUTPATIENT)
Age: 47
End: 2022-02-14

## 2022-03-11 ENCOUNTER — RX RENEWAL (OUTPATIENT)
Age: 47
End: 2022-03-11

## 2022-03-11 ENCOUNTER — NON-APPOINTMENT (OUTPATIENT)
Age: 47
End: 2022-03-11

## 2022-04-07 ENCOUNTER — RX RENEWAL (OUTPATIENT)
Age: 47
End: 2022-04-07

## 2022-05-31 ENCOUNTER — FORM ENCOUNTER (OUTPATIENT)
Age: 47
End: 2022-05-31

## 2022-06-01 ENCOUNTER — APPOINTMENT (OUTPATIENT)
Dept: INFECTIOUS DISEASE | Facility: CLINIC | Age: 47
End: 2022-06-01
Payer: MEDICAID

## 2022-06-01 ENCOUNTER — LABORATORY RESULT (OUTPATIENT)
Age: 47
End: 2022-06-01

## 2022-06-01 ENCOUNTER — NON-APPOINTMENT (OUTPATIENT)
Age: 47
End: 2022-06-01

## 2022-06-01 ENCOUNTER — OUTPATIENT (OUTPATIENT)
Dept: OUTPATIENT SERVICES | Facility: HOSPITAL | Age: 47
LOS: 1 days | End: 2022-06-01
Payer: MEDICAID

## 2022-06-01 VITALS
OXYGEN SATURATION: 100 % | TEMPERATURE: 97.6 F | RESPIRATION RATE: 14 BRPM | SYSTOLIC BLOOD PRESSURE: 112 MMHG | HEART RATE: 69 BPM | DIASTOLIC BLOOD PRESSURE: 78 MMHG | HEIGHT: 64 IN | BODY MASS INDEX: 29.37 KG/M2 | WEIGHT: 172 LBS

## 2022-06-01 DIAGNOSIS — B20 HUMAN IMMUNODEFICIENCY VIRUS [HIV] DISEASE: ICD-10-CM

## 2022-06-01 LAB
4/8 RATIO: 0.77 RATIO — LOW (ref 0.9–3.6)
ABS CD8: 825 /UL — HIGH (ref 142–740)
BASOPHILS # BLD AUTO: 0.04 K/UL — SIGNIFICANT CHANGE UP (ref 0–0.2)
BASOPHILS NFR BLD AUTO: 0.5 % — SIGNIFICANT CHANGE UP (ref 0–2)
CD3 BLASTS SPEC-ACNC: 1491 /UL — SIGNIFICANT CHANGE UP (ref 672–1870)
CD3 BLASTS SPEC-ACNC: 80 % — SIGNIFICANT CHANGE UP (ref 59–83)
CD4 %: 34 % — SIGNIFICANT CHANGE UP (ref 30–62)
CD8 %: 44 % — HIGH (ref 12–36)
EOSINOPHIL # BLD AUTO: 0.13 K/UL — SIGNIFICANT CHANGE UP (ref 0–0.5)
EOSINOPHIL NFR BLD AUTO: 1.8 % — SIGNIFICANT CHANGE UP (ref 0–6)
HBV CORE AB SER-ACNC: SIGNIFICANT CHANGE UP
HCT VFR BLD CALC: 38.4 % — SIGNIFICANT CHANGE UP (ref 34.5–45)
HCV AB S/CO SERPL IA: 0.1 S/CO — SIGNIFICANT CHANGE UP (ref 0–0.99)
HCV AB SERPL-IMP: SIGNIFICANT CHANGE UP
HGB BLD-MCNC: 13 G/DL — SIGNIFICANT CHANGE UP (ref 11.5–15.5)
IMM GRANULOCYTES NFR BLD AUTO: 0.1 % — SIGNIFICANT CHANGE UP (ref 0–1.5)
LYMPHOCYTES # BLD AUTO: 2.06 K/UL — SIGNIFICANT CHANGE UP (ref 1–3.3)
LYMPHOCYTES # BLD AUTO: 28.1 % — SIGNIFICANT CHANGE UP (ref 13–44)
MANUAL SMEAR VERIFICATION: SIGNIFICANT CHANGE UP
MCHC RBC-ENTMCNC: 33 PG — SIGNIFICANT CHANGE UP (ref 27–34)
MCHC RBC-ENTMCNC: 33.9 GM/DL — SIGNIFICANT CHANGE UP (ref 32–36)
MCV RBC AUTO: 97.5 FL — SIGNIFICANT CHANGE UP (ref 80–100)
MONOCYTES # BLD AUTO: 0.51 K/UL — SIGNIFICANT CHANGE UP (ref 0–0.9)
MONOCYTES NFR BLD AUTO: 7 % — SIGNIFICANT CHANGE UP (ref 2–14)
NEUTROPHILS # BLD AUTO: 4.57 K/UL — SIGNIFICANT CHANGE UP (ref 1.8–7.4)
NEUTROPHILS NFR BLD AUTO: 62.5 % — SIGNIFICANT CHANGE UP (ref 43–77)
PLAT MORPH BLD: NORMAL — SIGNIFICANT CHANGE UP
PLATELET # BLD AUTO: 295 K/UL — SIGNIFICANT CHANGE UP (ref 150–400)
RBC # BLD: 3.94 M/UL — SIGNIFICANT CHANGE UP (ref 3.8–5.2)
RBC # FLD: 12.7 % — SIGNIFICANT CHANGE UP (ref 10.3–14.5)
RBC BLD AUTO: NORMAL — SIGNIFICANT CHANGE UP
T-CELL CD4 SUBSET PNL BLD: 634 /UL — SIGNIFICANT CHANGE UP (ref 489–1457)
WBC # BLD: 7.32 K/UL — SIGNIFICANT CHANGE UP (ref 3.8–10.5)
WBC # FLD AUTO: 7.32 K/UL — SIGNIFICANT CHANGE UP (ref 3.8–10.5)

## 2022-06-01 PROCEDURE — 87491 CHLMYD TRACH DNA AMP PROBE: CPT

## 2022-06-01 PROCEDURE — 99214 OFFICE O/P EST MOD 30 MIN: CPT | Mod: GC

## 2022-06-01 PROCEDURE — 86360 T CELL ABSOLUTE COUNT/RATIO: CPT

## 2022-06-01 PROCEDURE — 80053 COMPREHEN METABOLIC PANEL: CPT

## 2022-06-01 PROCEDURE — 85025 COMPLETE CBC W/AUTO DIFF WBC: CPT

## 2022-06-01 PROCEDURE — G0463: CPT

## 2022-06-01 PROCEDURE — 36415 COLL VENOUS BLD VENIPUNCTURE: CPT

## 2022-06-01 PROCEDURE — 80061 LIPID PANEL: CPT

## 2022-06-01 PROCEDURE — 86593 SYPHILIS TEST NON-TREP QUANT: CPT

## 2022-06-01 PROCEDURE — 87536 HIV-1 QUANT&REVRSE TRNSCRPJ: CPT

## 2022-06-01 PROCEDURE — 86803 HEPATITIS C AB TEST: CPT

## 2022-06-01 PROCEDURE — 86359 T CELLS TOTAL COUNT: CPT

## 2022-06-01 PROCEDURE — 87661 TRICHOMONAS VAGINALIS AMPLIF: CPT

## 2022-06-01 PROCEDURE — 86704 HEP B CORE ANTIBODY TOTAL: CPT

## 2022-06-01 PROCEDURE — 87591 N.GONORRHOEAE DNA AMP PROB: CPT

## 2022-06-02 LAB
ALBUMIN SERPL ELPH-MCNC: 4.4 G/DL — SIGNIFICANT CHANGE UP (ref 3.3–5)
ALP SERPL-CCNC: 66 U/L — SIGNIFICANT CHANGE UP (ref 40–120)
ALT FLD-CCNC: 19 U/L — SIGNIFICANT CHANGE UP (ref 10–45)
ANION GAP SERPL CALC-SCNC: 15 MMOL/L — SIGNIFICANT CHANGE UP (ref 5–17)
AST SERPL-CCNC: 24 U/L — SIGNIFICANT CHANGE UP (ref 10–40)
BILIRUB SERPL-MCNC: 0.5 MG/DL — SIGNIFICANT CHANGE UP (ref 0.2–1.2)
BUN SERPL-MCNC: 14 MG/DL — SIGNIFICANT CHANGE UP (ref 7–23)
C TRACH RRNA SPEC QL NAA+PROBE: SIGNIFICANT CHANGE UP
CALCIUM SERPL-MCNC: 9.4 MG/DL — SIGNIFICANT CHANGE UP (ref 8.4–10.5)
CHLORIDE SERPL-SCNC: 103 MMOL/L — SIGNIFICANT CHANGE UP (ref 96–108)
CHOLEST SERPL-MCNC: 140 MG/DL — SIGNIFICANT CHANGE UP
CO2 SERPL-SCNC: 24 MMOL/L — SIGNIFICANT CHANGE UP (ref 22–31)
CREAT SERPL-MCNC: 0.99 MG/DL — SIGNIFICANT CHANGE UP (ref 0.5–1.3)
EGFR: 70 ML/MIN/1.73M2 — SIGNIFICANT CHANGE UP
GLUCOSE SERPL-MCNC: 80 MG/DL — SIGNIFICANT CHANGE UP (ref 70–99)
HDLC SERPL-MCNC: 54 MG/DL — SIGNIFICANT CHANGE UP
HIV-1 VIRAL LOAD RESULT: SIGNIFICANT CHANGE UP
HIV1 RNA # SERPL NAA+PROBE: SIGNIFICANT CHANGE UP COPIES/ML
HIV1 RNA SER-IMP: SIGNIFICANT CHANGE UP
HIV1 RNA SERPL NAA+PROBE-ACNC: SIGNIFICANT CHANGE UP
HIV1 RNA SERPL NAA+PROBE-LOG#: SIGNIFICANT CHANGE UP LG COP/ML
LIPID PNL WITH DIRECT LDL SERPL: 69 MG/DL — SIGNIFICANT CHANGE UP
N GONORRHOEA RRNA SPEC QL NAA+PROBE: SIGNIFICANT CHANGE UP
NON HDL CHOLESTEROL: 86 MG/DL — SIGNIFICANT CHANGE UP
POTASSIUM SERPL-MCNC: 3.8 MMOL/L — SIGNIFICANT CHANGE UP (ref 3.5–5.3)
POTASSIUM SERPL-SCNC: 3.8 MMOL/L — SIGNIFICANT CHANGE UP (ref 3.5–5.3)
PROT SERPL-MCNC: 6.7 G/DL — SIGNIFICANT CHANGE UP (ref 6–8.3)
RPR SER-TITR: SIGNIFICANT CHANGE UP
SODIUM SERPL-SCNC: 141 MMOL/L — SIGNIFICANT CHANGE UP (ref 135–145)
SPECIMEN SOURCE: SIGNIFICANT CHANGE UP
SPECIMEN SOURCE: SIGNIFICANT CHANGE UP
T VAGINALIS RRNA SPEC QL NAA+PROBE: SIGNIFICANT CHANGE UP
TRIGL SERPL-MCNC: 88 MG/DL — SIGNIFICANT CHANGE UP

## 2022-06-02 NOTE — HISTORY OF PRESENT ILLNESS
[FreeTextEntry1] : Ms Mcgee is a 46 year old lady w/ PMhx of HIV/AIDS well-controlled on complera (Not interested in regimen updating), uncontrolled HTN, hx of latent syphillis tx'ed, HSV2 infection of sacrum, here for Annual visit after last visit in feb 2021\par \par Pt is feeling well, daily compliance with complera with no missed doses. Denies fever, chills, cough, sore throat, dysphagia, abdominal pain, diarrhea, dysuria.\par Reports she has occasional vaginal itching and is following with GYN [Sexually Active] : The patient is not sexually active [de-identified] : [place] last year [de-identified] : Unemployed [de-identified] : son

## 2022-06-02 NOTE — ASSESSMENT
[FreeTextEntry1] : Ms Mcgee is a 46 year old lady w/ PMhx of latent syphills s/p tx, HTN, HIV AIDS currently well-controlled on complera here for annual visit\par \par  # HIV/AIDS \par -well controlled (CD4 368 in 2021, VL <30)\par -Pt takes complera, not interested in updating regimen in spite of counseling\par -check T cell subset, VL\par \par # Vesicular Rash of Sacrum - resolved\par -no recurrence since last visit\par \par #  HTN - \par -Pt states she is compliant with HCTZ and clonidine daily, \par - Advised to continue to f/u with cardiologist\par \par #Vaginal Itching\par - Not present currently. Will f/u with gyn\par \par HCM\par  - Up to date on previous vaccinations (Hep B, Hep A, PCV and PPSV 23 and TdAP)\par - Has gotten one dose of Menactra in 2017\par - Flu: 2021\par - COVID vaccine:  2 doses of phizer (4/2021)\par - States she is up to date on mammography with PCP, cannot state when she last had it\par - no fam hx of colon ca -> However eligible for Colonoscopy\par - Dental: Visited dentist few months back and got an implant\par - Opthalmology : Visited 2-3 months back\par - Gynecologist - 4 months back and uptodate on PAP smaer and mammogram. \par \par RTC in 5-6 months [Treatment Education] : treatment education [Treatment Adherence] : treatment adherence [Rx Dose / Side Effects] : Rx dose/side effects [Risk Reduction] : risk reduction [Drug Interactions / Side Effects] : drug interactions/side effects [Disclosure of Diagnosis] : disclosure of diagnosis [HIV Education] : HIV Education [Sexuality / Safer Sex] : sexuality/safer sex [Anticipatory Guidance] : anticipatory guidance [EtOH Counseling] : EtOH counseling [Smoking Cessation] : smoking cessation [Education Materials Provided] : Eduction materials were provided

## 2022-06-03 ENCOUNTER — NON-APPOINTMENT (OUTPATIENT)
Age: 47
End: 2022-06-03

## 2022-07-01 ENCOUNTER — RX RENEWAL (OUTPATIENT)
Age: 47
End: 2022-07-01

## 2022-07-18 ENCOUNTER — NON-APPOINTMENT (OUTPATIENT)
Age: 47
End: 2022-07-18

## 2022-07-20 ENCOUNTER — NON-APPOINTMENT (OUTPATIENT)
Age: 47
End: 2022-07-20

## 2022-07-21 ENCOUNTER — NON-APPOINTMENT (OUTPATIENT)
Age: 47
End: 2022-07-21

## 2022-08-12 ENCOUNTER — RX RENEWAL (OUTPATIENT)
Age: 47
End: 2022-08-12

## 2022-09-13 ENCOUNTER — RX RENEWAL (OUTPATIENT)
Age: 47
End: 2022-09-13

## 2022-10-08 ENCOUNTER — RX RENEWAL (OUTPATIENT)
Age: 47
End: 2022-10-08

## 2022-10-25 ENCOUNTER — RX RENEWAL (OUTPATIENT)
Age: 47
End: 2022-10-25

## 2022-11-10 ENCOUNTER — RX RENEWAL (OUTPATIENT)
Age: 47
End: 2022-11-10

## 2022-12-10 ENCOUNTER — RX RENEWAL (OUTPATIENT)
Age: 47
End: 2022-12-10

## 2023-01-08 ENCOUNTER — RX RENEWAL (OUTPATIENT)
Age: 48
End: 2023-01-08

## 2023-01-11 ENCOUNTER — TRANSCRIPTION ENCOUNTER (OUTPATIENT)
Age: 48
End: 2023-01-11

## 2023-01-26 ENCOUNTER — APPOINTMENT (OUTPATIENT)
Dept: CARDIOLOGY | Facility: CLINIC | Age: 48
End: 2023-01-26
Payer: MEDICAID

## 2023-01-26 ENCOUNTER — NON-APPOINTMENT (OUTPATIENT)
Age: 48
End: 2023-01-26

## 2023-01-26 VITALS
SYSTOLIC BLOOD PRESSURE: 154 MMHG | DIASTOLIC BLOOD PRESSURE: 98 MMHG | WEIGHT: 175 LBS | BODY MASS INDEX: 30.04 KG/M2 | OXYGEN SATURATION: 100 % | TEMPERATURE: 97.6 F | HEART RATE: 53 BPM | RESPIRATION RATE: 18 BRPM

## 2023-01-26 DIAGNOSIS — R94.31 ABNORMAL ELECTROCARDIOGRAM [ECG] [EKG]: ICD-10-CM

## 2023-01-26 DIAGNOSIS — I10 ESSENTIAL (PRIMARY) HYPERTENSION: ICD-10-CM

## 2023-01-26 PROCEDURE — 99214 OFFICE O/P EST MOD 30 MIN: CPT | Mod: 25

## 2023-01-26 PROCEDURE — 93000 ELECTROCARDIOGRAM COMPLETE: CPT

## 2023-01-26 PROCEDURE — 93306 TTE W/DOPPLER COMPLETE: CPT

## 2023-01-26 RX ORDER — HYDROCHLOROTHIAZIDE 25 MG/1
25 TABLET ORAL
Qty: 30 | Refills: 0 | Status: DISCONTINUED | COMMUNITY
Start: 2021-03-05 | End: 2023-01-26

## 2023-01-26 NOTE — HISTORY OF PRESENT ILLNESS
[FreeTextEntry1] : 46 y/o F with history of HTN, HIV who presents for f/u.\par \par Pt denies any CP/SOB/LE edema. No changes in exercise tolerance. She states she takes all her medications every day and does not miss doses. She is on clonidine 0.3 bid and HCTZ 25mg daily. She is not really cutting down on salt. Per previous documentation, she is allergic to amlodipine and lisinopril. \par \par Last seen by Dr. Salazar 4/15/21: Underwent 7 day event monitor showing overall NSR, occasional sinus bradycardia (as low as 47). She did not show for echocardiogram\par \par 1. HTN: on clonidine. Patient reports recent improved control.\par 2. HIV: on medications.\par 3. Abnormal EKG: patient was noted to be bradycardic and referred for further evaluation. She has noted intermittent chest pain with palpitations and dizziness lasting a few seconds. The patient denies SOB and her ET is stable. She denies cough or fevers.\par She has not received her COVID vaccine and states that she doesn't want to get it because "DMX  after getting the vaccine".

## 2023-01-26 NOTE — ASSESSMENT
[FreeTextEntry1] : 46 y/o F with history of HTN, RBBB, well controlled HIV who presents for f/u.\par \par Pt denies any CP/SOB/LE edema. No changes in exercise tolerance. She states she takes all her medications every day and does not miss doses. She is on clonidine 0.3 bid and HCTZ 25mg daily. She is not really cutting down on salt. Per previous documentation, she is allergic to amlodipine and lisinopril. \par \par 1) HTN, elevated today\par 2) RBBB\par - switch HCTZ 25 to chlorthalidone 25mg daily, I advised pt to check bloodwork/electrolytes with ID/PCP next week\par - continue clonidine 0.3 bid for now\par - recommend dietary/lifestyle changes, start monitoring BPs at home\par - Pt underwent TTE 1/26/23 showing overall preserved LV function (EF 65-70%),with moderate concentric LVH, moderate TR without pulm HTN. However, there is a possible focal area of basal inferior hypokinesis, which could be related to off-axis imaging. She is asymptomatic without CP/SOB at this time and there is no evidence of inferior wall infarct on ECG. I discussed this result with patient via telephone. Plan to monitor symptoms at this time, should she develop symptoms, she will likely need ischemic evaluation to r/o CAD.\par \par 3) Follow-up, 3 months

## 2023-01-31 ENCOUNTER — FORM ENCOUNTER (OUTPATIENT)
Age: 48
End: 2023-01-31

## 2023-02-01 ENCOUNTER — LABORATORY RESULT (OUTPATIENT)
Age: 48
End: 2023-02-01

## 2023-02-01 ENCOUNTER — APPOINTMENT (OUTPATIENT)
Dept: INFECTIOUS DISEASE | Facility: CLINIC | Age: 48
End: 2023-02-01
Payer: MEDICAID

## 2023-02-01 ENCOUNTER — OUTPATIENT (OUTPATIENT)
Dept: OUTPATIENT SERVICES | Facility: HOSPITAL | Age: 48
LOS: 1 days | End: 2023-02-01
Payer: MEDICAID

## 2023-02-01 VITALS
HEART RATE: 60 BPM | DIASTOLIC BLOOD PRESSURE: 95 MMHG | HEIGHT: 64 IN | TEMPERATURE: 97.6 F | WEIGHT: 175 LBS | SYSTOLIC BLOOD PRESSURE: 148 MMHG | BODY MASS INDEX: 29.88 KG/M2 | OXYGEN SATURATION: 97 %

## 2023-02-01 DIAGNOSIS — B20 HUMAN IMMUNODEFICIENCY VIRUS [HIV] DISEASE: ICD-10-CM

## 2023-02-01 LAB
4/8 RATIO: 0.67 RATIO — LOW (ref 0.9–3.6)
A1C WITH ESTIMATED AVERAGE GLUCOSE RESULT: 5.5 % — SIGNIFICANT CHANGE UP (ref 4–5.6)
ABS CD8: 876 CELLS/UL — HIGH (ref 142–740)
ALBUMIN SERPL ELPH-MCNC: 4.3 G/DL — SIGNIFICANT CHANGE UP (ref 3.3–5)
ALP SERPL-CCNC: 71 U/L — SIGNIFICANT CHANGE UP (ref 40–120)
ALT FLD-CCNC: 16 U/L — SIGNIFICANT CHANGE UP (ref 10–45)
ANION GAP SERPL CALC-SCNC: 11 MMOL/L — SIGNIFICANT CHANGE UP (ref 5–17)
AST SERPL-CCNC: 23 U/L — SIGNIFICANT CHANGE UP (ref 10–40)
BILIRUB SERPL-MCNC: 0.2 MG/DL — SIGNIFICANT CHANGE UP (ref 0.2–1.2)
BUN SERPL-MCNC: 14 MG/DL — SIGNIFICANT CHANGE UP (ref 7–23)
C TRACH RRNA SPEC QL NAA+PROBE: SIGNIFICANT CHANGE UP
CALCIUM SERPL-MCNC: 9.6 MG/DL — SIGNIFICANT CHANGE UP (ref 8.4–10.5)
CD3 BLASTS SPEC-ACNC: 1504 CELLS/UL — SIGNIFICANT CHANGE UP (ref 672–1870)
CD3 BLASTS SPEC-ACNC: 77 % — SIGNIFICANT CHANGE UP (ref 59–83)
CD4 %: 30 % — SIGNIFICANT CHANGE UP (ref 30–62)
CD8 %: 45 % — HIGH (ref 12–36)
CHLORIDE SERPL-SCNC: 101 MMOL/L — SIGNIFICANT CHANGE UP (ref 96–108)
CHOLEST SERPL-MCNC: 164 MG/DL — SIGNIFICANT CHANGE UP
CO2 SERPL-SCNC: 28 MMOL/L — SIGNIFICANT CHANGE UP (ref 22–31)
CREAT SERPL-MCNC: 1.06 MG/DL — SIGNIFICANT CHANGE UP (ref 0.5–1.3)
EGFR: 64 ML/MIN/1.73M2 — SIGNIFICANT CHANGE UP
ESTIMATED AVERAGE GLUCOSE: 111 MG/DL — SIGNIFICANT CHANGE UP (ref 68–114)
GLUCOSE SERPL-MCNC: 122 MG/DL — HIGH (ref 70–99)
HCT VFR BLD CALC: 43.7 % — SIGNIFICANT CHANGE UP (ref 34.5–45)
HDLC SERPL-MCNC: 59 MG/DL — SIGNIFICANT CHANGE UP
HGB BLD-MCNC: 14.4 G/DL — SIGNIFICANT CHANGE UP (ref 11.5–15.5)
LIPID PNL WITH DIRECT LDL SERPL: 90 MG/DL — SIGNIFICANT CHANGE UP
MCHC RBC-ENTMCNC: 33 GM/DL — SIGNIFICANT CHANGE UP (ref 32–36)
MCHC RBC-ENTMCNC: 33.4 PG — SIGNIFICANT CHANGE UP (ref 27–34)
MCV RBC AUTO: 101.4 FL — HIGH (ref 80–100)
N GONORRHOEA RRNA SPEC QL NAA+PROBE: SIGNIFICANT CHANGE UP
NON HDL CHOLESTEROL: 104 MG/DL — SIGNIFICANT CHANGE UP
PLATELET # BLD AUTO: 305 K/UL — SIGNIFICANT CHANGE UP (ref 150–400)
POTASSIUM SERPL-MCNC: 3.8 MMOL/L — SIGNIFICANT CHANGE UP (ref 3.5–5.3)
POTASSIUM SERPL-SCNC: 3.8 MMOL/L — SIGNIFICANT CHANGE UP (ref 3.5–5.3)
PROT SERPL-MCNC: 6.7 G/DL — SIGNIFICANT CHANGE UP (ref 6–8.3)
RBC # BLD: 4.31 M/UL — SIGNIFICANT CHANGE UP (ref 3.8–5.2)
RBC # FLD: 12.6 % — SIGNIFICANT CHANGE UP (ref 10.3–14.5)
SODIUM SERPL-SCNC: 140 MMOL/L — SIGNIFICANT CHANGE UP (ref 135–145)
SPECIMEN SOURCE: SIGNIFICANT CHANGE UP
T-CELL CD4 SUBSET PNL BLD: 590 CELLS/UL — SIGNIFICANT CHANGE UP (ref 489–1457)
TRIGL SERPL-MCNC: 70 MG/DL — SIGNIFICANT CHANGE UP
WBC # BLD: 6.86 K/UL — SIGNIFICANT CHANGE UP (ref 3.8–10.5)
WBC # FLD AUTO: 6.86 K/UL — SIGNIFICANT CHANGE UP (ref 3.8–10.5)

## 2023-02-01 PROCEDURE — 83036 HEMOGLOBIN GLYCOSYLATED A1C: CPT

## 2023-02-01 PROCEDURE — 80061 LIPID PANEL: CPT

## 2023-02-01 PROCEDURE — 87491 CHLMYD TRACH DNA AMP PROBE: CPT

## 2023-02-01 PROCEDURE — 99214 OFFICE O/P EST MOD 30 MIN: CPT

## 2023-02-01 PROCEDURE — 86359 T CELLS TOTAL COUNT: CPT

## 2023-02-01 PROCEDURE — 36415 COLL VENOUS BLD VENIPUNCTURE: CPT

## 2023-02-01 PROCEDURE — 87536 HIV-1 QUANT&REVRSE TRNSCRPJ: CPT

## 2023-02-01 PROCEDURE — 86360 T CELL ABSOLUTE COUNT/RATIO: CPT

## 2023-02-01 PROCEDURE — 87591 N.GONORRHOEAE DNA AMP PROB: CPT

## 2023-02-01 PROCEDURE — 80053 COMPREHEN METABOLIC PANEL: CPT

## 2023-02-01 PROCEDURE — 85027 COMPLETE CBC AUTOMATED: CPT

## 2023-02-01 PROCEDURE — G0463: CPT

## 2023-02-01 RX ORDER — BLOOD PRESSURE TEST KIT-MEDIUM
KIT MISCELLANEOUS
Qty: 1 | Refills: 0 | Status: COMPLETED | COMMUNITY
Start: 2020-10-07 | End: 2023-02-01

## 2023-02-01 RX ORDER — BLOOD-GLUCOSE METER
KIT MISCELLANEOUS
Qty: 1 | Refills: 0 | Status: ACTIVE | COMMUNITY
Start: 2023-02-01 | End: 1900-01-01

## 2023-02-02 LAB
HIV-1 VIRAL LOAD RESULT: SIGNIFICANT CHANGE UP
HIV1 RNA # SERPL NAA+PROBE: SIGNIFICANT CHANGE UP COPIES/ML
HIV1 RNA SER-IMP: SIGNIFICANT CHANGE UP
HIV1 RNA SERPL NAA+PROBE-ACNC: SIGNIFICANT CHANGE UP
HIV1 RNA SERPL NAA+PROBE-LOG#: SIGNIFICANT CHANGE UP LG COP/ML

## 2023-02-02 NOTE — ASSESSMENT
[FreeTextEntry1] : Ms Mcgee is a 47 year old lady w/ PMhx of latent syphills s/p tx, HTN, HIV currently well-controlled on complera here for annual visit\par \par  # HIV\par -well controlled (CD4 368 in 2021, VL <30)\par -Pt takes complera, not interested in updating regimen in spite of counseling\par -check T cell subset, VL\par \par #  HTN - \par -Pt states she is compliant with chlorothiazide and clonidine daily, \par - Advised to continue to f/u with cardiologist\par - Will prescribe a BP monitor\par \par HCM\par  - Up to date on previous vaccinations (Hep B, Hep A, PCV and PPSV 23 and TdAP)\par - Has gotten one dose of Menactra in 2017\par - Flu: 2023\par - COVID vaccine:  2 doses of phizer (4/2021) -> Encouraged to get the new bivalent booster\par - States she is up to date on mammography with PCP, cannot state when she last had it\par - no fam hx of colon ca -> However eligible for Colonoscopy\par - Dental: Visited dentist a year back and got an implant\par - Opthalmology : reports poor vision, advised to see opthal\par - Gynecologist - 1 year back and uptodate on PAP smear and mammogram. \par \par

## 2023-02-02 NOTE — HISTORY OF PRESENT ILLNESS
[FreeTextEntry1] : Ms Mcgee is a 46 year old lady w/ PMhx of HIV/AIDS well-controlled on complera (Not interested in regimen updating), uncontrolled HTN, hx of latent syphillis tx'ed, HSV2 infection of sacrum, here for Annual visit after last visit in June 2022\par \par Pt is feeling well, daily compliance with complera with no missed doses. Denies fever, chills, cough, sore throat, dysphagia, abdominal pain, diarrhea, dysuria.\par No sexual partner in more than a decade\par Reports her HTN medication was recently changed but BP still in 150s. Requested a BP machine so that she can f/u with cardiologist with BP readings. \par Has poor vision. Needs to see opthalmologist\par  [Sexually Active] : The patient is not sexually active [de-identified] : [place] in 2021 [de-identified] : Unemployed [de-identified] : son

## 2023-02-02 NOTE — END OF VISIT
[] : Fellow [FreeTextEntry3] : Patient seen and examined with Dr Ibarra\par I agree with his interval history exam and plans as noted above\par \par Patient reports adherence with ARV meds\par will check HIV related labs\par marijuana counseling provided- not intereated in quitting\par declines vaccine for COVID

## 2023-02-06 ENCOUNTER — NON-APPOINTMENT (OUTPATIENT)
Age: 48
End: 2023-02-06

## 2023-02-07 ENCOUNTER — RX RENEWAL (OUTPATIENT)
Age: 48
End: 2023-02-07

## 2023-03-07 ENCOUNTER — RX RENEWAL (OUTPATIENT)
Age: 48
End: 2023-03-07

## 2023-04-05 ENCOUNTER — RX RENEWAL (OUTPATIENT)
Age: 48
End: 2023-04-05

## 2023-05-04 ENCOUNTER — RX RENEWAL (OUTPATIENT)
Age: 48
End: 2023-05-04

## 2023-05-05 ENCOUNTER — RX RENEWAL (OUTPATIENT)
Age: 48
End: 2023-05-05

## 2023-06-15 ENCOUNTER — RX RENEWAL (OUTPATIENT)
Age: 48
End: 2023-06-15

## 2023-06-16 ENCOUNTER — RX RENEWAL (OUTPATIENT)
Age: 48
End: 2023-06-16

## 2023-07-05 ENCOUNTER — APPOINTMENT (OUTPATIENT)
Dept: INFECTIOUS DISEASE | Facility: CLINIC | Age: 48
End: 2023-07-05

## 2023-07-12 ENCOUNTER — APPOINTMENT (OUTPATIENT)
Dept: INFECTIOUS DISEASE | Facility: CLINIC | Age: 48
End: 2023-07-12

## 2023-07-12 NOTE — HISTORY OF PRESENT ILLNESS
[FreeTextEntry1] : Was informed by reception that patient left the clinic before she was put in a room to be seen.\par Patient will need to re-schedule.

## 2023-07-19 ENCOUNTER — RX RENEWAL (OUTPATIENT)
Age: 48
End: 2023-07-19

## 2023-09-19 ENCOUNTER — RX RENEWAL (OUTPATIENT)
Age: 48
End: 2023-09-19

## 2023-09-20 ENCOUNTER — NON-APPOINTMENT (OUTPATIENT)
Age: 48
End: 2023-09-20

## 2023-10-16 ENCOUNTER — RX RENEWAL (OUTPATIENT)
Age: 48
End: 2023-10-16

## 2023-11-16 ENCOUNTER — RX RENEWAL (OUTPATIENT)
Age: 48
End: 2023-11-16

## 2023-11-16 RX ORDER — FLUOCINONIDE 0.5 MG/G
0.05 CREAM TOPICAL 3 TIMES DAILY
Qty: 30 | Refills: 0 | Status: ACTIVE | COMMUNITY
Start: 2017-05-16 | End: 1900-01-01

## 2023-11-17 ENCOUNTER — RX RENEWAL (OUTPATIENT)
Age: 48
End: 2023-11-17

## 2023-11-17 RX ORDER — CHLORTHALIDONE 25 MG/1
25 TABLET ORAL DAILY
Qty: 30 | Refills: 0 | Status: ACTIVE | COMMUNITY
Start: 2023-01-26 | End: 1900-01-01

## 2023-11-18 ENCOUNTER — RX RENEWAL (OUTPATIENT)
Age: 48
End: 2023-11-18

## 2023-12-21 ENCOUNTER — RX RENEWAL (OUTPATIENT)
Age: 48
End: 2023-12-21

## 2024-01-09 ENCOUNTER — NON-APPOINTMENT (OUTPATIENT)
Age: 49
End: 2024-01-09

## 2024-01-23 ENCOUNTER — RX RENEWAL (OUTPATIENT)
Age: 49
End: 2024-01-23

## 2024-01-30 ENCOUNTER — NON-APPOINTMENT (OUTPATIENT)
Age: 49
End: 2024-01-30

## 2024-01-31 ENCOUNTER — APPOINTMENT (OUTPATIENT)
Dept: INFECTIOUS DISEASE | Facility: CLINIC | Age: 49
End: 2024-01-31
Payer: MEDICAID

## 2024-01-31 ENCOUNTER — OUTPATIENT (OUTPATIENT)
Dept: OUTPATIENT SERVICES | Facility: HOSPITAL | Age: 49
LOS: 1 days | End: 2024-01-31
Payer: MEDICAID

## 2024-01-31 VITALS
BODY MASS INDEX: 29.53 KG/M2 | SYSTOLIC BLOOD PRESSURE: 102 MMHG | OXYGEN SATURATION: 97 % | HEART RATE: 56 BPM | WEIGHT: 173 LBS | HEIGHT: 64 IN | TEMPERATURE: 97.5 F | DIASTOLIC BLOOD PRESSURE: 67 MMHG

## 2024-01-31 DIAGNOSIS — B20 HUMAN IMMUNODEFICIENCY VIRUS [HIV] DISEASE: ICD-10-CM

## 2024-01-31 DIAGNOSIS — A53.9 SYPHILIS, UNSPECIFIED: ICD-10-CM

## 2024-01-31 DIAGNOSIS — B00.9 HERPESVIRAL INFECTION, UNSPECIFIED: ICD-10-CM

## 2024-01-31 DIAGNOSIS — Z01.00 ENCOUNTER FOR EXAMINATION OF EYES AND VISION W/OUT ABNORMAL FINDINGS: ICD-10-CM

## 2024-01-31 DIAGNOSIS — Z01.419 ENCOUNTER FOR GYNECOLOGICAL EXAMINATION (GENERAL) (ROUTINE) W/OUT ABNORMAL FINDINGS: ICD-10-CM

## 2024-01-31 DIAGNOSIS — Z01.20 ENCOUNTER FOR DENTAL EXAMINATION AND CLEANING W/OUT ABNORMAL FINDINGS: ICD-10-CM

## 2024-01-31 PROCEDURE — 99214 OFFICE O/P EST MOD 30 MIN: CPT

## 2024-01-31 PROCEDURE — 90732 PPSV23 VACC 2 YRS+ SUBQ/IM: CPT

## 2024-01-31 PROCEDURE — G0009: CPT

## 2024-01-31 PROCEDURE — 90734 MENACWYD/MENACWYCRM VACC IM: CPT

## 2024-01-31 PROCEDURE — 90472 IMMUNIZATION ADMIN EACH ADD: CPT

## 2024-01-31 PROCEDURE — G0463: CPT | Mod: 25

## 2024-02-01 DIAGNOSIS — Z23 ENCOUNTER FOR IMMUNIZATION: ICD-10-CM

## 2024-02-01 NOTE — REVIEW OF SYSTEMS
[Fever] : no fever [Chills] : no chills [Normal Appetite] : appetite not normal  [Chest Pain] : no chest pain [Palpitations] : no palpitations [Shortness Of Breath] : no shortness of breath [Wheezing] : no wheezing [Cough] : no cough [SOB on Exertion] : no shortness of breath during exertion [Abdominal Pain] : no abdominal pain [Vomiting] : no vomiting [Constipation] : no constipation [Diarrhea] : no diarrhea [Dysuria] : no dysuria [Pelvic Pain] : no pelvic pain [Joint Pain] : no joint pain [Confused] : no confusion [Convulsions] : no convulsions

## 2024-02-01 NOTE — ASSESSMENT
[FreeTextEntry1] : Ms Mcgee is a 47 year old lady w/ PMhx of latent syphills s/p tx, HTN, HIV currently well-controlled on complera here for annual visit   # HIV -well controlled  -Pt takes Complera, not interested in updating regimen in spite of counseling -check T cell subset, VL  # HTN - -Pt states she is compliant with chlorothiazide and clonidine daily, - Advised to continue to f/u with cardiologist  #Hx of syphilis - treated   #HCM - Has gotten one dose of Menactra in 2017, agreed for 2nd shot today - Flu: declined Flu vaccine -Hep A and B: will check immunity  - COVID vaccine: 2 doses of phizer (4/2021) -> Encouraged to get the new bivalent booster - States she is up to date on mammography with PCP, cannot state when she last had it - no fam hx of colon ca -> However eligible for Colonoscopy - Gynecologist -duptodate on PAP smear and mammogram.  RTC in 4-6 months   Discussed with Dr Moreno    [Treatment Education] : treatment education [Treatment Adherence] : treatment adherence [Rx Dose / Side Effects] : Rx dose/side effects [Risk Reduction] : risk reduction [Nutritional / Food Issues] : nutritional/food issues [Universal Precautions] : universal precautions [Drug Interactions / Side Effects] : drug interactions/side effects

## 2024-02-01 NOTE — HISTORY OF PRESENT ILLNESS
[FreeTextEntry1] : Ms Mcgee is a 46 year old lady w/ PMhx of HIV/AIDS well-controlled on complera (Not interested in regimen updating), uncontrolled HTN, hx of latent syphillis tx'ed, HSV2 infection of sacrum, here for Annual visit after last visit in June 2022 Pt is feeling well, daily compliance with complera with no missed doses. Denies fever, chills, cough, sore throat, dysphagia, abdominal pain, diarrhea, dysuria. No sexual partner in more than a decade  Stated would like to have her PCP prescribe ART rather than coming here every time to get labs done. Requesting to get blood worl done in the clinic, she declined going to lab to do it. Stated complaint with meds, no new complaints    Sexual History: The patient is not sexually active.   Travel: [place] in 2021.   Occupation: Unemployed.   Lives with son.

## 2024-02-01 NOTE — END OF VISIT
[] : Fellow [FreeTextEntry3] : patient seen and examined. Was sound asleep in the office chair, room with smell of marijuana which patient denied. Social work tried to see patient but she left prior to the RN or social work visit social work reported ACs case as patient has a 11yo child that she intended to  from school  will need tox screen at next visit

## 2024-02-01 NOTE — PHYSICAL EXAM
[General Appearance - Alert] : alert [General Appearance - In No Acute Distress] : in no acute distress [General Appearance - Well Nourished] : well nourished [General Appearance - Well-Appearing] : healthy appearing [Neck Appearance] : the appearance of the neck was normal [Respiration, Rhythm And Depth] : normal respiratory rhythm and effort [Exaggerated Use Of Accessory Muscles For Inspiration] : no accessory muscle use [Heart Rate And Rhythm] : heart rate was normal and rhythm regular [Heart Sounds] : normal S1 and S2 [Heart Sounds Gallop] : no gallops [Murmurs] : no murmurs [Heart Sounds Pericardial Friction Rub] : no pericardial rub [Full Pulse] : the pedal pulses are present [Edema] : there was no peripheral edema [Bowel Sounds] : normal bowel sounds [Abdomen Soft] : soft [Abdomen Tenderness] : non-tender [No Palpable Adenopathy] : no palpable adenopathy [] : no rash [Skin Color & Pigmentation] : normal skin color and pigmentation [Skin Lesions] : no skin lesions [Sensation] : the sensory exam was normal to light touch and pinprick [No Focal Deficits] : no focal deficits [Oriented To Time, Place, And Person] : oriented to person, place, and time [Affect] : the affect was normal [Sclera] : the sclera and conjunctiva were normal [PERRL With Normal Accommodation] : pupils were equal in size, round, reactive to light [Extraocular Movements] : extraocular movements were intact [Outer Ear] : the ears and nose were normal in appearance [Oropharynx] : the oropharynx was normal with no thrush

## 2024-02-05 ENCOUNTER — NON-APPOINTMENT (OUTPATIENT)
Age: 49
End: 2024-02-05

## 2024-02-14 ENCOUNTER — NON-APPOINTMENT (OUTPATIENT)
Age: 49
End: 2024-02-14

## 2024-02-22 ENCOUNTER — RX RENEWAL (OUTPATIENT)
Age: 49
End: 2024-02-22

## 2024-02-22 RX ORDER — CLONIDINE HYDROCHLORIDE 0.3 MG/1
0.3 TABLET ORAL
Qty: 30 | Refills: 5 | Status: ACTIVE | COMMUNITY
Start: 2021-04-05 | End: 1900-01-01

## 2024-03-26 ENCOUNTER — RX RENEWAL (OUTPATIENT)
Age: 49
End: 2024-03-26

## 2024-06-05 ENCOUNTER — APPOINTMENT (OUTPATIENT)
Dept: INFECTIOUS DISEASE | Facility: CLINIC | Age: 49
End: 2024-06-05

## 2024-08-14 ENCOUNTER — NON-APPOINTMENT (OUTPATIENT)
Age: 49
End: 2024-08-14

## 2024-08-14 ENCOUNTER — APPOINTMENT (OUTPATIENT)
Dept: INFECTIOUS DISEASE | Facility: CLINIC | Age: 49
End: 2024-08-14

## 2024-08-15 ENCOUNTER — NON-APPOINTMENT (OUTPATIENT)
Age: 49
End: 2024-08-15

## 2024-08-19 ENCOUNTER — NON-APPOINTMENT (OUTPATIENT)
Age: 49
End: 2024-08-19

## 2024-09-10 ENCOUNTER — NON-APPOINTMENT (OUTPATIENT)
Age: 49
End: 2024-09-10

## 2024-10-23 ENCOUNTER — APPOINTMENT (OUTPATIENT)
Dept: INFECTIOUS DISEASE | Facility: CLINIC | Age: 49
End: 2024-10-23

## 2024-10-24 ENCOUNTER — NON-APPOINTMENT (OUTPATIENT)
Age: 49
End: 2024-10-24

## 2024-11-01 ENCOUNTER — NON-APPOINTMENT (OUTPATIENT)
Age: 49
End: 2024-11-01

## 2024-11-11 ENCOUNTER — NON-APPOINTMENT (OUTPATIENT)
Age: 49
End: 2024-11-11

## 2024-11-18 ENCOUNTER — NON-APPOINTMENT (OUTPATIENT)
Age: 49
End: 2024-11-18

## 2025-01-29 ENCOUNTER — APPOINTMENT (OUTPATIENT)
Dept: INFECTIOUS DISEASE | Facility: CLINIC | Age: 50
End: 2025-01-29
Payer: MEDICAID

## 2025-01-29 ENCOUNTER — NON-APPOINTMENT (OUTPATIENT)
Age: 50
End: 2025-01-29

## 2025-01-29 ENCOUNTER — RX RENEWAL (OUTPATIENT)
Age: 50
End: 2025-01-29

## 2025-01-29 ENCOUNTER — LABORATORY RESULT (OUTPATIENT)
Age: 50
End: 2025-01-29

## 2025-01-29 ENCOUNTER — OUTPATIENT (OUTPATIENT)
Dept: OUTPATIENT SERVICES | Facility: HOSPITAL | Age: 50
LOS: 1 days | End: 2025-01-29
Payer: MEDICAID

## 2025-01-29 VITALS
DIASTOLIC BLOOD PRESSURE: 104 MMHG | SYSTOLIC BLOOD PRESSURE: 161 MMHG | WEIGHT: 171.3 LBS | BODY MASS INDEX: 29.24 KG/M2 | TEMPERATURE: 98.6 F | OXYGEN SATURATION: 100 % | HEART RATE: 76 BPM | HEIGHT: 64 IN

## 2025-01-29 DIAGNOSIS — Z01.20 ENCOUNTER FOR DENTAL EXAMINATION AND CLEANING W/OUT ABNORMAL FINDINGS: ICD-10-CM

## 2025-01-29 DIAGNOSIS — B20 HUMAN IMMUNODEFICIENCY VIRUS [HIV] DISEASE: ICD-10-CM

## 2025-01-29 DIAGNOSIS — I10 ESSENTIAL (PRIMARY) HYPERTENSION: ICD-10-CM

## 2025-01-29 DIAGNOSIS — Z01.419 ENCOUNTER FOR GYNECOLOGICAL EXAMINATION (GENERAL) (ROUTINE) W/OUT ABNORMAL FINDINGS: ICD-10-CM

## 2025-01-29 DIAGNOSIS — Z01.00 ENCOUNTER FOR EXAMINATION OF EYES AND VISION W/OUT ABNORMAL FINDINGS: ICD-10-CM

## 2025-01-29 PROCEDURE — 99214 OFFICE O/P EST MOD 30 MIN: CPT | Mod: GC

## 2025-01-29 PROCEDURE — G0463: CPT

## 2025-01-29 RX ORDER — VALACYCLOVIR 1 G/1
1 TABLET, FILM COATED ORAL
Qty: 20 | Refills: 0 | Status: ACTIVE | COMMUNITY
Start: 2025-01-29

## 2025-01-29 RX ORDER — VALACYCLOVIR 500 MG/1
500 TABLET, FILM COATED ORAL TWICE DAILY
Qty: 20 | Refills: 0 | Status: ACTIVE | COMMUNITY
Start: 2025-01-29 | End: 1900-01-01

## 2025-01-30 ENCOUNTER — NON-APPOINTMENT (OUTPATIENT)
Age: 50
End: 2025-01-30

## 2025-01-30 LAB
ALBUMIN SERPL ELPH-MCNC: 4.4 G/DL
ALP BLD-CCNC: 77 U/L
ALT SERPL-CCNC: 36 U/L
ANION GAP SERPL CALC-SCNC: 13 MMOL/L
AST SERPL-CCNC: 22 U/L
BILIRUB SERPL-MCNC: 1 MG/DL
BUN SERPL-MCNC: 16 MG/DL
CALCIUM SERPL-MCNC: 9.2 MG/DL
CD3 CELLS # BLD: 1358 CELLS/UL
CD3 CELLS NFR BLD: 75 %
CD3+CD4+ CELLS # BLD: 566 CELLS/UL
CD3+CD4+ CELLS NFR BLD: 31 %
CD3+CD4+ CELLS/CD3+CD8+ CLL SPEC: 0.73 RATIO
CD3+CD8+ CELLS # SPEC: 780 CELLS/UL
CD3+CD8+ CELLS NFR BLD: 43 %
CHLORIDE SERPL-SCNC: 101 MMOL/L
CHOLEST SERPL-MCNC: 177 MG/DL
CO2 SERPL-SCNC: 26 MMOL/L
CREAT SERPL-MCNC: 1.05 MG/DL
EGFR: 64 ML/MIN/1.73M2
ESTIMATED AVERAGE GLUCOSE: 100 MG/DL
GLUCOSE SERPL-MCNC: 85 MG/DL
HBA1C MFR BLD HPLC: 5.1 %
HBV CORE IGG+IGM SER QL: NONREACTIVE
HBV CORE IGM SER QL: NONREACTIVE
HBV SURFACE AB SER QL: NONREACTIVE
HBV SURFACE AG SER QL: NONREACTIVE
HCT VFR BLD CALC: 40.8 %
HCV AB SER QL: NONREACTIVE
HCV S/CO RATIO: 0.09 S/CO
HDLC SERPL-MCNC: 74 MG/DL
HEPATITIS A IGG ANTIBODY: REACTIVE
HGB BLD-MCNC: 13.7 G/DL
HIV1 RNA # SERPL NAA+PROBE: NORMAL
HIV1 RNA # SERPL NAA+PROBE: NORMAL COPIES/ML
LDLC SERPL CALC-MCNC: 92 MG/DL
MCHC RBC-ENTMCNC: 33 PG
MCHC RBC-ENTMCNC: 33.6 G/DL
MCV RBC AUTO: 98.3 FL
NONHDLC SERPL-MCNC: 103 MG/DL
PLATELET # BLD AUTO: 300 K/UL
POTASSIUM SERPL-SCNC: 3.9 MMOL/L
PROT SERPL-MCNC: 6.9 G/DL
RBC # BLD: 4.15 M/UL
RBC # FLD: 14.4 %
SODIUM SERPL-SCNC: 140 MMOL/L
TRIGL SERPL-MCNC: 59 MG/DL
VIRAL LOAD INTERP: NORMAL
VIRAL LOAD LOG: NORMAL LG COP/ML
WBC # FLD AUTO: 8.56 K/UL

## 2025-01-31 LAB
HEPB DNA PCR INT: NOT DETECTED
HEPB DNA PCR LOG: NOT DETECTED LOGIU/ML

## 2025-02-05 LAB
M TB IFN-G BLD-IMP: NEGATIVE
QUANTIFERON TB PLUS MITOGEN MINUS NIL: >10 IU/ML
QUANTIFERON TB PLUS NIL: 0.03 IU/ML
QUANTIFERON TB PLUS TB1 MINUS NIL: 0 IU/ML
QUANTIFERON TB PLUS TB2 MINUS NIL: 0 IU/ML

## 2025-03-03 ENCOUNTER — RX RENEWAL (OUTPATIENT)
Age: 50
End: 2025-03-03

## 2025-04-08 ENCOUNTER — NON-APPOINTMENT (OUTPATIENT)
Age: 50
End: 2025-04-08

## 2025-04-15 ENCOUNTER — NON-APPOINTMENT (OUTPATIENT)
Age: 50
End: 2025-04-15

## 2025-07-17 ENCOUNTER — NON-APPOINTMENT (OUTPATIENT)
Age: 50
End: 2025-07-17

## 2025-07-31 ENCOUNTER — NON-APPOINTMENT (OUTPATIENT)
Age: 50
End: 2025-07-31